# Patient Record
Sex: MALE | Race: WHITE
[De-identification: names, ages, dates, MRNs, and addresses within clinical notes are randomized per-mention and may not be internally consistent; named-entity substitution may affect disease eponyms.]

---

## 2018-10-13 ENCOUNTER — HOSPITAL ENCOUNTER (EMERGENCY)
Dept: HOSPITAL 47 - EC | Age: 33
Discharge: HOME | End: 2018-10-13
Payer: COMMERCIAL

## 2018-10-13 VITALS — TEMPERATURE: 98.5 F | HEART RATE: 100 BPM | DIASTOLIC BLOOD PRESSURE: 73 MMHG | SYSTOLIC BLOOD PRESSURE: 113 MMHG

## 2018-10-13 VITALS — RESPIRATION RATE: 18 BRPM

## 2018-10-13 DIAGNOSIS — J40: Primary | ICD-10-CM

## 2018-10-13 DIAGNOSIS — Z90.89: ICD-10-CM

## 2018-10-13 DIAGNOSIS — J98.01: ICD-10-CM

## 2018-10-13 DIAGNOSIS — Z77.22: ICD-10-CM

## 2018-10-13 PROCEDURE — 99285 EMERGENCY DEPT VISIT HI MDM: CPT

## 2018-10-13 PROCEDURE — 96372 THER/PROPH/DIAG INJ SC/IM: CPT

## 2018-10-13 PROCEDURE — 71046 X-RAY EXAM CHEST 2 VIEWS: CPT

## 2018-10-13 PROCEDURE — 94640 AIRWAY INHALATION TREATMENT: CPT

## 2018-10-13 NOTE — XR
EXAMINATION TYPE: XR chest 2V

 

DATE OF EXAM: 10/13/2018

 

HISTORY: Difficulty breathing .

 

REFERENCE: NONE.

 

FINDINGS: The lungs are clear. Pleural space are clear. The heart is not enlarged.

 

IMPRESSION: 

 

NO ACTIVE INTRATHORACIC DISEASE.

## 2018-10-13 NOTE — ED
General Adult HPI





- General


Chief complaint: Upper Respiratory Infection


Stated complaint: Cough


Time Seen by Provider: 10/13/18 07:25


Source: patient, RN notes reviewed


Mode of arrival: ambulatory


Limitations: no limitations





- History of Present Illness


Initial comments: 





This is a 32-year-old male who presents emergency Department because of 

difficulty breathing and a cough.  Patient states he lives with a smoker.  

Patient states he's been coughing for about 2 weeks and over the last week has 

gotten progressively worse.  Patient states over the last few days he started 

having quite a bit of sputum production.  Patient states he has not had a fever 

that he knows of but he has had the chills.  Patient denies any chest pain or 

palpitations.  Patient states he does hear himself wheezing on occasion as 

well.  Patient denies any abdominal pain patient denies nausea vomiting 

diarrhea.  Patient denies any headache patient denies any numbness weakness.  

Patient denies any lightheadedness or dizziness.





- Related Data


 Previous Rx's











 Medication  Instructions  Recorded


 


Amoxicillin 500 mg PO Q8H 10 Days  day 10/30/16


 


Ibuprofen [Motrin] 600 mg PO Q6HR PRN #20 day 10/30/16


 


Albuterol Inhaler [Ventolin Hfa 1 - 2 puff INHALATION Q6HR PRN #2 10/13/18





Inhaler] puff 


 


Azithromycin [Zithromax Tri-Jules] 500 mg PO DAILY #3 tab 10/13/18


 


predniSONE 40 mg PO DAILY #8 tab 10/13/18











 Allergies











Allergy/AdvReac Type Severity Reaction Status Date / Time


 


No Known Allergies Allergy   Verified 10/13/18 07:29














Review of Systems


ROS Statement: 


Those systems with pertinent positive or pertinent negative responses have been 

documented in the HPI.





ROS Other: All systems not noted in ROS Statement are negative.





Past Medical History


Past Medical History: Seizure Disorder


Additional Past Medical History / Comment(s): narcolepsy, eare infections


History of Any Multi-Drug Resistant Organisms: None Reported


Past Surgical History: Ear Surgery, Tonsillectomy


Past Psychological History: ADD/ADHD


Smoking Status: Never smoker


Past Alcohol Use History: Occasional


Past Drug Use History: None Reported





General Exam





- General Exam Comments


Initial Comments: 





GENERAL:


Patient is well-developed and well-nourished.  Patient is nontoxic and well-

hydrated and is in mild distress.





ENT:


Neck is soft and supple.  No significant lymphadenopathy is noted.  Oropharynx 

is clear.  Moist mucous membranes.  Neck has full range of motion without 

eliciting any pain.  





EYES:


The sclera were anicteric and conjunctiva were pink and moist.  Extraocular 

movements were intact and pupils were equal round and reactive to light.  

Eyelids were unremarkable.





PULMONARY:


Patient has occasional aspirin wheeze.





CARDIOVASCULAR:


There is a regular rate and rhythm without any murmurs gallops or rubs.  





ABDOMEN:


Soft and nontender with normal bowel sounds.  No palpable organomegaly was 

noted.  There is no palpable pulsatile mass.





SKIN:


Skin is clear with no lesions or rashes and otherwise unremarkable.





NEUROLOGIC:


Patient is alert and oriented x3.  Cranial nerves II through XII are grossly 

intact.  Motor and sensory are also intact.  Normal speech, volume and content.

  Symmetrical smile.  





MUSCULOSKELETAL:


Normal extremities with adequate strength and full range of motion.  





LYMPHATICS:


No significant lymphadenopathy is noted





PSYCHIATRIC:


Normal psychiatric evaluation.  


Limitations: no limitations





Course


 Vital Signs











  10/13/18 10/13/18 10/13/18





  07:27 08:06 08:15


 


Temperature 98.3 F  


 


Pulse Rate 106 H 100 108 H


 


Respiratory 18  





Rate   


 


Blood Pressure 127/70  


 


O2 Sat by Pulse 92 L  





Oximetry   














  10/13/18





  09:00


 


Temperature 98.5 F


 


Pulse Rate 100


 


Respiratory 18





Rate 


 


Blood Pressure 113/73


 


O2 Sat by Pulse 95





Oximetry 














Medical Decision Making





- Medical Decision Making





Chest x-ray shows no acute abnormality





Patient received Rocephin emergency department and breathing treatment.  After 

the breathing treatment patient was feeling much better and his wheezing had 

resolved.





Disposition


Clinical Impression: 


 Bronchitis with bronchospasm





Disposition: HOME SELF-CARE


Instructions:  Acute Bronchitis (ED), Bronchospasm (ED)


Prescriptions: 


Albuterol Inhaler [Ventolin Hfa Inhaler] 1 - 2 puff INHALATION Q6HR PRN #2 puff


 PRN Reason: Difficulty breathing  


Azithromycin [Zithromax Tri-Jules] 500 mg PO DAILY #3 tab


predniSONE 40 mg PO DAILY #8 tab


Is patient prescribed a controlled substance at d/c from ED?: No


Referrals: 


None,Stated [Primary Care Provider] - 1-2 days


Time of Disposition: 08:51

## 2019-08-23 ENCOUNTER — HOSPITAL ENCOUNTER (EMERGENCY)
Dept: HOSPITAL 47 - EC | Age: 34
Discharge: HOME | End: 2019-08-23
Payer: COMMERCIAL

## 2019-08-23 VITALS — TEMPERATURE: 98.3 F | SYSTOLIC BLOOD PRESSURE: 124 MMHG | HEART RATE: 76 BPM | DIASTOLIC BLOOD PRESSURE: 78 MMHG

## 2019-08-23 VITALS — RESPIRATION RATE: 18 BRPM

## 2019-08-23 DIAGNOSIS — R11.2: ICD-10-CM

## 2019-08-23 DIAGNOSIS — R10.9: Primary | ICD-10-CM

## 2019-08-23 LAB
ALBUMIN SERPL-MCNC: 4.8 G/DL (ref 3.5–5)
ALP SERPL-CCNC: 72 U/L (ref 38–126)
ALT SERPL-CCNC: 46 U/L (ref 21–72)
AMYLASE SERPL-CCNC: 61 U/L (ref 30–110)
ANION GAP SERPL CALC-SCNC: 12 MMOL/L
AST SERPL-CCNC: 27 U/L (ref 17–59)
BASOPHILS # BLD AUTO: 0.1 K/UL (ref 0–0.2)
BASOPHILS NFR BLD AUTO: 1 %
BUN SERPL-SCNC: 18 MG/DL (ref 9–20)
CALCIUM SPEC-MCNC: 9.7 MG/DL (ref 8.4–10.2)
CHLORIDE SERPL-SCNC: 105 MMOL/L (ref 98–107)
CO2 SERPL-SCNC: 24 MMOL/L (ref 22–30)
EOSINOPHIL # BLD AUTO: 0.4 K/UL (ref 0–0.7)
EOSINOPHIL NFR BLD AUTO: 5 %
ERYTHROCYTE [DISTWIDTH] IN BLOOD BY AUTOMATED COUNT: 5.09 M/UL (ref 4.3–5.9)
ERYTHROCYTE [DISTWIDTH] IN BLOOD: 13.9 % (ref 11.5–15.5)
GLUCOSE SERPL-MCNC: 115 MG/DL (ref 74–99)
HCT VFR BLD AUTO: 46.9 % (ref 39–53)
HGB BLD-MCNC: 16.3 GM/DL (ref 13–17.5)
LYMPHOCYTES # SPEC AUTO: 1.4 K/UL (ref 1–4.8)
LYMPHOCYTES NFR SPEC AUTO: 19 %
MCH RBC QN AUTO: 32 PG (ref 25–35)
MCHC RBC AUTO-ENTMCNC: 34.7 G/DL (ref 31–37)
MCV RBC AUTO: 92.3 FL (ref 80–100)
MONOCYTES # BLD AUTO: 0.4 K/UL (ref 0–1)
MONOCYTES NFR BLD AUTO: 5 %
NEUTROPHILS # BLD AUTO: 4.8 K/UL (ref 1.3–7.7)
NEUTROPHILS NFR BLD AUTO: 67 %
PH UR: 6 [PH] (ref 5–8)
PLATELET # BLD AUTO: 212 K/UL (ref 150–450)
POTASSIUM SERPL-SCNC: 3.7 MMOL/L (ref 3.5–5.1)
PROT SERPL-MCNC: 7.2 G/DL (ref 6.3–8.2)
SODIUM SERPL-SCNC: 141 MMOL/L (ref 137–145)
SP GR UR: 1.04 (ref 1–1.03)
UROBILINOGEN UR QL STRIP: <2 MG/DL (ref ?–2)
WBC # BLD AUTO: 7.1 K/UL (ref 3.8–10.6)

## 2019-08-23 PROCEDURE — 81003 URINALYSIS AUTO W/O SCOPE: CPT

## 2019-08-23 PROCEDURE — 96375 TX/PRO/DX INJ NEW DRUG ADDON: CPT

## 2019-08-23 PROCEDURE — 82150 ASSAY OF AMYLASE: CPT

## 2019-08-23 PROCEDURE — 99284 EMERGENCY DEPT VISIT MOD MDM: CPT

## 2019-08-23 PROCEDURE — 85025 COMPLETE CBC W/AUTO DIFF WBC: CPT

## 2019-08-23 PROCEDURE — 83690 ASSAY OF LIPASE: CPT

## 2019-08-23 PROCEDURE — 36415 COLL VENOUS BLD VENIPUNCTURE: CPT

## 2019-08-23 PROCEDURE — 96374 THER/PROPH/DIAG INJ IV PUSH: CPT

## 2019-08-23 PROCEDURE — 80053 COMPREHEN METABOLIC PANEL: CPT

## 2019-08-23 PROCEDURE — 74177 CT ABD & PELVIS W/CONTRAST: CPT

## 2019-08-23 PROCEDURE — 96361 HYDRATE IV INFUSION ADD-ON: CPT
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
## 2019-08-23 NOTE — CT
EXAMINATION TYPE: CT abdomen pelvis w con

 

DATE OF EXAM: 8/23/2019

 

COMPARISON: None

 

HISTORY: RLQ pain

 

CT DLP: 1718.1 mGycm

Automated exposure control for dose reduction was used.

 

TECHNIQUE:  Helical acquisition of images was performed from the lung bases through the pelvis.

 

CONTRAST: 

Performed without Oral Contrast and with IV Contrast, patient injected with 100 mL of Isovue 300.

 

FINDINGS: 

 

Lung bases are clear. There is no pleural effusion. Heart size is normal. There is no pericardial eff
usion.

 

Liver spleen pancreas gallbladder appear normal. Bile ducts are not dilated.

 

There is no adrenal mass. Stomach appears normal. Kidneys show satisfactory contrast opacification. T
here is no hydronephrosis. Ureters are not dilated. Bladder distends smoothly. There is no inguinal h
ernia. There is no free fluid in the pelvis. There is no evidence of pelvic mass. Appendix appears no
rmal.

 

There is no mesenteric edema. There is no ascites or free air. There is no evidence of bowel obstruct
ion.

 

Lumbar vertebra have normal spacing and alignment. Bony pelvis is intact. There is bone island in the
 anterior L4 vertebral body.

IMPRESSION: 

NEGATIVE CT SCAN ABDOMEN AND PELVIS. NORMAL APPENDIX. NO RENAL STONE OR OBSTRUCTION.

## 2019-09-27 ENCOUNTER — HOSPITAL ENCOUNTER (EMERGENCY)
Dept: HOSPITAL 47 - EC | Age: 34
LOS: 1 days | Discharge: HOME | End: 2019-09-28
Payer: COMMERCIAL

## 2019-09-27 VITALS — RESPIRATION RATE: 20 BRPM | TEMPERATURE: 98 F

## 2019-09-27 DIAGNOSIS — R10.84: Primary | ICD-10-CM

## 2019-09-27 DIAGNOSIS — R11.0: ICD-10-CM

## 2019-09-27 PROCEDURE — 81003 URINALYSIS AUTO W/O SCOPE: CPT

## 2019-09-27 PROCEDURE — 85025 COMPLETE CBC W/AUTO DIFF WBC: CPT

## 2019-09-27 PROCEDURE — 36415 COLL VENOUS BLD VENIPUNCTURE: CPT

## 2019-09-27 PROCEDURE — 86140 C-REACTIVE PROTEIN: CPT

## 2019-09-27 PROCEDURE — 80053 COMPREHEN METABOLIC PANEL: CPT

## 2019-09-27 PROCEDURE — 99284 EMERGENCY DEPT VISIT MOD MDM: CPT

## 2019-09-27 PROCEDURE — 83690 ASSAY OF LIPASE: CPT

## 2019-09-27 PROCEDURE — 82150 ASSAY OF AMYLASE: CPT

## 2019-09-27 PROCEDURE — 80329 ANALGESICS NON-OPIOID 1 OR 2: CPT

## 2019-09-28 VITALS — SYSTOLIC BLOOD PRESSURE: 137 MMHG | DIASTOLIC BLOOD PRESSURE: 98 MMHG | HEART RATE: 82 BPM

## 2019-09-28 LAB
ALBUMIN SERPL-MCNC: 4.7 G/DL (ref 3.5–5)
ALP SERPL-CCNC: 70 U/L (ref 38–126)
ALT SERPL-CCNC: 29 U/L (ref 21–72)
AMYLASE SERPL-CCNC: 50 U/L (ref 30–110)
ANION GAP SERPL CALC-SCNC: 11 MMOL/L
AST SERPL-CCNC: 22 U/L (ref 17–59)
BASOPHILS # BLD AUTO: 0.1 K/UL (ref 0–0.2)
BASOPHILS NFR BLD AUTO: 2 %
BUN SERPL-SCNC: 15 MG/DL (ref 9–20)
CALCIUM SPEC-MCNC: 9.9 MG/DL (ref 8.4–10.2)
CHLORIDE SERPL-SCNC: 102 MMOL/L (ref 98–107)
CO2 SERPL-SCNC: 28 MMOL/L (ref 22–30)
EOSINOPHIL # BLD AUTO: 0.3 K/UL (ref 0–0.7)
EOSINOPHIL NFR BLD AUTO: 5 %
ERYTHROCYTE [DISTWIDTH] IN BLOOD BY AUTOMATED COUNT: 4.97 M/UL (ref 4.3–5.9)
ERYTHROCYTE [DISTWIDTH] IN BLOOD: 12 % (ref 11.5–15.5)
GLUCOSE SERPL-MCNC: 133 MG/DL (ref 74–99)
HCT VFR BLD AUTO: 44.2 % (ref 39–53)
HGB BLD-MCNC: 16.1 GM/DL (ref 13–17.5)
LYMPHOCYTES # SPEC AUTO: 1.4 K/UL (ref 1–4.8)
LYMPHOCYTES NFR SPEC AUTO: 23 %
MCH RBC QN AUTO: 32.4 PG (ref 25–35)
MCHC RBC AUTO-ENTMCNC: 36.4 G/DL (ref 31–37)
MCV RBC AUTO: 89 FL (ref 80–100)
MONOCYTES # BLD AUTO: 0.3 K/UL (ref 0–1)
MONOCYTES NFR BLD AUTO: 6 %
NEUTROPHILS # BLD AUTO: 3.8 K/UL (ref 1.3–7.7)
NEUTROPHILS NFR BLD AUTO: 62 %
PH UR: 5 [PH] (ref 5–8)
PLATELET # BLD AUTO: 250 K/UL (ref 150–450)
POTASSIUM SERPL-SCNC: 3.8 MMOL/L (ref 3.5–5.1)
PROT SERPL-MCNC: 7.1 G/DL (ref 6.3–8.2)
SODIUM SERPL-SCNC: 141 MMOL/L (ref 137–145)
SP GR UR: 1.02 (ref 1–1.03)
UROBILINOGEN UR QL STRIP: <2 MG/DL (ref ?–2)
WBC # BLD AUTO: 6.1 K/UL (ref 3.8–10.6)

## 2019-09-28 NOTE — ED
Abdominal Pain HPI





- General


Chief Complaint: Abdominal Pain


Stated Complaint: Abd Pain


Time Seen by Provider: 09/27/19 23:51


Source: patient


Mode of arrival: ambulatory


Limitations: no limitations





- History of Present Illness


Initial Comments: 





This patient is a 33-year-old man who presents to be evaluated for abdominal 

pain.  He states that it has started after he began on antibiotic related to 

tooth pain.  He describes it as a bloating or cramping feeling.  He has not 

noted worsening or relieving factors.


MD Complaint: abdominal pain


-: hour(s)


Location: diffuse


Radiation: none


Migration to: no migration


Severity: mild


Quality: cramping


Consistency: intermittent


Improves With: nothing


Worsens With: nothing


Associated Symptoms: nausea





- Related Data


                                  Previous Rx's











 Medication  Instructions  Recorded


 


Amoxicillin 500 mg PO Q8H 10 Days  day 10/30/16


 


Ibuprofen [Motrin] 600 mg PO Q6HR PRN #20 day 10/30/16


 


Albuterol Inhaler [Ventolin Hfa 1 - 2 puff INHALATION Q6HR PRN #2 10/13/18





Inhaler] puff 


 


Azithromycin [Zithromax Tri-Jules] 500 mg PO DAILY #3 tab 10/13/18


 


predniSONE 40 mg PO DAILY #8 tab 10/13/18


 


Ondansetron Odt [Zofran Odt] 4 mg PO Q8HR PRN #10 tab 08/23/19











                                    Allergies











Allergy/AdvReac Type Severity Reaction Status Date / Time


 


No Known Allergies Allergy   Verified 09/27/19 23:51














Review of Systems


ROS Statement: 


Those systems with pertinent positive or pertinent negative responses have been 

documented in the HPI.





ROS Other: All systems not noted in ROS Statement are negative.


Constitutional: Denies: fever, chills


Respiratory: Denies: cough, dyspnea


Cardiovascular: Denies: chest pain, palpitations, edema


Gastrointestinal: Reports: abdominal pain, nausea.  Denies: vomiting


Genitourinary: Denies: dysuria, hematuria


Musculoskeletal: Denies: back pain


Skin: Denies: rash


Neurological: Denies: headache, weakness, numbness





Past Medical History


Past Medical History: Seizure Disorder


Additional Past Medical History / Comment(s): narcolepsy


History of Any Multi-Drug Resistant Organisms: None Reported


Past Surgical History: Ear Surgery, Tonsillectomy


Past Psychological History: ADD/ADHD, Depression


Smoking Status: Never smoker


Past Alcohol Use History: Occasional


Past Drug Use History: None Reported





General Exam


Limitations: no limitations


General appearance: alert, in no apparent distress


Head exam: Present: atraumatic, normocephalic


Eye exam: Present: normal appearance.  Absent: scleral icterus, conjunctival 

injection


ENT exam: Present: normal oropharynx


Neck exam: Present: normal inspection, full ROM


Respiratory exam: Present: normal lung sounds bilaterally.  Absent: respiratory 

distress, wheezes, rales, rhonchi, stridor


Cardiovascular Exam: Present: regular rate, normal rhythm, normal heart sounds. 

Absent: systolic murmur, diastolic murmur, rubs, gallop


GI/Abdominal exam: Present: soft.  Absent: distended, tenderness, guarding, 

rebound, rigid, mass


Extremities exam: Present: normal inspection, normal capillary refill.  Absent: 

pedal edema, calf tenderness


Back exam: Present: normal inspection.  Absent: CVA tenderness (R), CVA 

tenderness (L)


Neurological exam: Present: alert


Skin exam: Present: warm, dry, intact, normal color.  Absent: rash





Course


                                   Vital Signs











  09/27/19 09/28/19





  23:47 01:28


 


Temperature 98 F 


 


Pulse Rate 100 82


 


Respiratory 20 20





Rate  


 


Blood Pressure 137/97 137/98


 


O2 Sat by Pulse 99 98





Oximetry  














Medical Decision Making





- Lab Data


Result diagrams: 


                                 09/28/19 00:06





                                 09/28/19 00:06


                                   Lab Results











  09/28/19 09/28/19 09/28/19 Range/Units





  00:02 00:06 00:06 


 


WBC    6.1  (3.8-10.6)  k/uL


 


RBC    4.97  (4.30-5.90)  m/uL


 


Hgb    16.1  (13.0-17.5)  gm/dL


 


Hct    44.2  (39.0-53.0)  %


 


MCV    89.0  (80.0-100.0)  fL


 


MCH    32.4  (25.0-35.0)  pg


 


MCHC    36.4  (31.0-37.0)  g/dL


 


RDW    12.0  (11.5-15.5)  %


 


Plt Count    250  (150-450)  k/uL


 


Neutrophils %    62  %


 


Lymphocytes %    23  %


 


Monocytes %    6  %


 


Eosinophils %    5  %


 


Basophils %    2  %


 


Neutrophils #    3.8  (1.3-7.7)  k/uL


 


Lymphocytes #    1.4  (1.0-4.8)  k/uL


 


Monocytes #    0.3  (0-1.0)  k/uL


 


Eosinophils #    0.3  (0-0.7)  k/uL


 


Basophils #    0.1  (0-0.2)  k/uL


 


Sodium   141   (137-145)  mmol/L


 


Potassium   3.8   (3.5-5.1)  mmol/L


 


Chloride   102   ()  mmol/L


 


Carbon Dioxide   28   (22-30)  mmol/L


 


Anion Gap   11   mmol/L


 


BUN   15   (9-20)  mg/dL


 


Creatinine   1.03   (0.66-1.25)  mg/dL


 


Est GFR (CKD-EPI)AfAm   >90   (>60 ml/min/1.73 sqM)  


 


Est GFR (CKD-EPI)NonAf   >90   (>60 ml/min/1.73 sqM)  


 


Glucose   133 H   (74-99)  mg/dL


 


Calcium   9.9   (8.4-10.2)  mg/dL


 


Total Bilirubin   0.6   (0.2-1.3)  mg/dL


 


AST   22   (17-59)  U/L


 


ALT   29   (21-72)  U/L


 


Alkaline Phosphatase   70   ()  U/L


 


C-Reactive Protein   <5.0   (<10.0)  mg/L


 


Total Protein   7.1   (6.3-8.2)  g/dL


 


Albumin   4.7   (3.5-5.0)  g/dL


 


Amylase   50   ()  U/L


 


Lipase   99   ()  U/L


 


Urine Color     


 


Urine Appearance     (Clear)  


 


Urine pH     (5.0-8.0)  


 


Ur Specific Gravity     (1.001-1.035)  


 


Urine Protein     (Negative)  


 


Urine Glucose (UA)     (Negative)  


 


Urine Ketones     (Negative)  


 


Urine Blood     (Negative)  


 


Urine Nitrite     (Negative)  


 


Urine Bilirubin     (Negative)  


 


Urine Urobilinogen     (<2.0)  mg/dL


 


Ur Leukocyte Esterase     (Negative)  


 


Acetaminophen  <10.0    ug/mL














  09/28/19 Range/Units





  00:06 


 


WBC   (3.8-10.6)  k/uL


 


RBC   (4.30-5.90)  m/uL


 


Hgb   (13.0-17.5)  gm/dL


 


Hct   (39.0-53.0)  %


 


MCV   (80.0-100.0)  fL


 


MCH   (25.0-35.0)  pg


 


MCHC   (31.0-37.0)  g/dL


 


RDW   (11.5-15.5)  %


 


Plt Count   (150-450)  k/uL


 


Neutrophils %   %


 


Lymphocytes %   %


 


Monocytes %   %


 


Eosinophils %   %


 


Basophils %   %


 


Neutrophils #   (1.3-7.7)  k/uL


 


Lymphocytes #   (1.0-4.8)  k/uL


 


Monocytes #   (0-1.0)  k/uL


 


Eosinophils #   (0-0.7)  k/uL


 


Basophils #   (0-0.2)  k/uL


 


Sodium   (137-145)  mmol/L


 


Potassium   (3.5-5.1)  mmol/L


 


Chloride   ()  mmol/L


 


Carbon Dioxide   (22-30)  mmol/L


 


Anion Gap   mmol/L


 


BUN   (9-20)  mg/dL


 


Creatinine   (0.66-1.25)  mg/dL


 


Est GFR (CKD-EPI)AfAm   (>60 ml/min/1.73 sqM)  


 


Est GFR (CKD-EPI)NonAf   (>60 ml/min/1.73 sqM)  


 


Glucose   (74-99)  mg/dL


 


Calcium   (8.4-10.2)  mg/dL


 


Total Bilirubin   (0.2-1.3)  mg/dL


 


AST   (17-59)  U/L


 


ALT   (21-72)  U/L


 


Alkaline Phosphatase   ()  U/L


 


C-Reactive Protein   (<10.0)  mg/L


 


Total Protein   (6.3-8.2)  g/dL


 


Albumin   (3.5-5.0)  g/dL


 


Amylase   ()  U/L


 


Lipase   ()  U/L


 


Urine Color  Yellow  


 


Urine Appearance  Clear  (Clear)  


 


Urine pH  5.0  (5.0-8.0)  


 


Ur Specific Gravity  1.018  (1.001-1.035)  


 


Urine Protein  Negative  (Negative)  


 


Urine Glucose (UA)  Negative  (Negative)  


 


Urine Ketones  Negative  (Negative)  


 


Urine Blood  Negative  (Negative)  


 


Urine Nitrite  Negative  (Negative)  


 


Urine Bilirubin  Negative  (Negative)  


 


Urine Urobilinogen  <2.0  (<2.0)  mg/dL


 


Ur Leukocyte Esterase  Negative  (Negative)  


 


Acetaminophen   ug/mL














Disposition


Clinical Impression: 


 Abdominal pain





Disposition: HOME SELF-CARE


Condition: Good


Instructions (If sedation given, give patient instructions):  Abdominal Pain 

(ED)


Is patient prescribed a controlled substance at d/c from ED?: No


Referrals: 


None,Stated [Primary Care Provider] - 1-2 days

## 2019-11-10 ENCOUNTER — HOSPITAL ENCOUNTER (EMERGENCY)
Dept: HOSPITAL 47 - EC | Age: 34
Discharge: HOME | End: 2019-11-10
Payer: COMMERCIAL

## 2019-11-10 VITALS — RESPIRATION RATE: 18 BRPM | TEMPERATURE: 97.7 F

## 2019-11-10 VITALS — HEART RATE: 72 BPM | SYSTOLIC BLOOD PRESSURE: 124 MMHG | DIASTOLIC BLOOD PRESSURE: 77 MMHG

## 2019-11-10 DIAGNOSIS — R10.13: Primary | ICD-10-CM

## 2019-11-10 DIAGNOSIS — R00.0: ICD-10-CM

## 2019-11-10 DIAGNOSIS — M79.661: ICD-10-CM

## 2019-11-10 LAB
ALBUMIN SERPL-MCNC: 5.1 G/DL (ref 3.5–5)
ALP SERPL-CCNC: 62 U/L (ref 38–126)
ALT SERPL-CCNC: 28 U/L (ref 21–72)
ANION GAP SERPL CALC-SCNC: 12 MMOL/L
APTT BLD: 26.1 SEC (ref 22–30)
AST SERPL-CCNC: 31 U/L (ref 17–59)
BASOPHILS # BLD AUTO: 0.1 K/UL (ref 0–0.2)
BASOPHILS NFR BLD AUTO: 1 %
BUN SERPL-SCNC: 19 MG/DL (ref 9–20)
CALCIUM SPEC-MCNC: 10 MG/DL (ref 8.4–10.2)
CHLORIDE SERPL-SCNC: 104 MMOL/L (ref 98–107)
CO2 SERPL-SCNC: 23 MMOL/L (ref 22–30)
EOSINOPHIL # BLD AUTO: 0.2 K/UL (ref 0–0.7)
EOSINOPHIL NFR BLD AUTO: 3 %
ERYTHROCYTE [DISTWIDTH] IN BLOOD BY AUTOMATED COUNT: 5.42 M/UL (ref 4.3–5.9)
ERYTHROCYTE [DISTWIDTH] IN BLOOD: 11.9 % (ref 11.5–15.5)
GLUCOSE SERPL-MCNC: 97 MG/DL (ref 74–99)
HCT VFR BLD AUTO: 50.2 % (ref 39–53)
HGB BLD-MCNC: 17.4 GM/DL (ref 13–17.5)
INR PPP: 1.1 (ref ?–1.2)
LYMPHOCYTES # SPEC AUTO: 0.9 K/UL (ref 1–4.8)
LYMPHOCYTES NFR SPEC AUTO: 15 %
MAGNESIUM SPEC-SCNC: 2.2 MG/DL (ref 1.6–2.3)
MCH RBC QN AUTO: 32.1 PG (ref 25–35)
MCHC RBC AUTO-ENTMCNC: 34.6 G/DL (ref 31–37)
MCV RBC AUTO: 92.6 FL (ref 80–100)
MONOCYTES # BLD AUTO: 0.3 K/UL (ref 0–1)
MONOCYTES NFR BLD AUTO: 5 %
NEUTROPHILS # BLD AUTO: 4.8 K/UL (ref 1.3–7.7)
NEUTROPHILS NFR BLD AUTO: 74 %
PLATELET # BLD AUTO: 229 K/UL (ref 150–450)
POTASSIUM SERPL-SCNC: 4.3 MMOL/L (ref 3.5–5.1)
PROT SERPL-MCNC: 7.7 G/DL (ref 6.3–8.2)
PT BLD: 11.3 SEC (ref 9–12)
SODIUM SERPL-SCNC: 139 MMOL/L (ref 137–145)
WBC # BLD AUTO: 6.5 K/UL (ref 3.8–10.6)

## 2019-11-10 PROCEDURE — 93005 ELECTROCARDIOGRAM TRACING: CPT

## 2019-11-10 PROCEDURE — 85025 COMPLETE CBC W/AUTO DIFF WBC: CPT

## 2019-11-10 PROCEDURE — 71275 CT ANGIOGRAPHY CHEST: CPT

## 2019-11-10 PROCEDURE — 80053 COMPREHEN METABOLIC PANEL: CPT

## 2019-11-10 PROCEDURE — 85610 PROTHROMBIN TIME: CPT

## 2019-11-10 PROCEDURE — 83880 ASSAY OF NATRIURETIC PEPTIDE: CPT

## 2019-11-10 PROCEDURE — 85730 THROMBOPLASTIN TIME PARTIAL: CPT

## 2019-11-10 PROCEDURE — 84484 ASSAY OF TROPONIN QUANT: CPT

## 2019-11-10 PROCEDURE — 99285 EMERGENCY DEPT VISIT HI MDM: CPT

## 2019-11-10 PROCEDURE — 83735 ASSAY OF MAGNESIUM: CPT

## 2019-11-10 PROCEDURE — 36415 COLL VENOUS BLD VENIPUNCTURE: CPT

## 2019-11-10 PROCEDURE — 93970 EXTREMITY STUDY: CPT

## 2019-11-10 PROCEDURE — 83690 ASSAY OF LIPASE: CPT

## 2019-11-10 NOTE — ED
General Adult HPI





- General


Chief complaint: Chest Pain


Stated complaint: Chest pain


Source: patient


Mode of arrival: ambulatory


Limitations: no limitations





- History of Present Illness


Initial comments: 





Dictation was produced using dragon dictation software. please excuse any 

grammatical, word or spelling errors. 





Chief Complaint: 33-year-old male presents with epigastric abdominal pain, chest

pain and palpitations since the last 3 days.





History of Present Illness: He 3-year-old male presents with the affirmation 

symptoms for the last 3 days.  Patient states he also has concomitant right 

posterior calf pain.  Patient states he noticed that he's been having 

palpitations.  He feels as though his heart is beating really hard and fast.  

Patient states the pain is to his lower substernal chest and epigastric area.  

Denies any radiation to the extremities or the back.  He states his pain is 

mild.  Does report that it's exacerbated with palpitations.  Patient states 

she's been having right posterior calf pain it's worse with plantar flexion.  

Denies any history of blood clots.  Patient denies that his symptoms are worse 

with deep inspiration.  Patient does not endorse dyspnea at this time.








The ROS documented in this emergency department record has been reviewed and 

confirmed by me.  Those systems with pertinent positive or negative responses 

have been documented in the HPI.  All other systems are other negative and/or 

noncontributory.








PHYSICAL EXAM:


General Impression: Alert and oriented x3, not in acute distress


HEENT: Normocephalic atraumatic, extra-ocular movements intact, pupils equal and

reactive to light bilaterally, mucous membranes moist.


Cardiovascular: Heart regular rate and rhythm, S1&S2 audible, no murmurs, rubs 

or gallops


Chest: Lungs clear to auscultation bilaterally, no rhonchi, no wheeze, no rales


Abdomen: Bowel sounds present, abdomen soft, non-tender, non-distended, no 

organomegaly


Musculoskeletal: Pulses present and equal in all extremities, no peripheral 

edema, no asymmetric leg swelling


Motor:  no focal deficits noted


Neurological: CN II-XII grossly intact, no focal motor or sensory deficits noted


Skin: Intact with no visualized rashes


Psych: Normal affect and mood





ED course: 33-year-old male presents with epigastric pain, chest pain and right 

calf pain.  Vitals upon arrival shows heart rate of 101, worse vital signs 

within normal limits.  Patient is tachycardic with S1 q 3 T3 pattern on his EKG.

 Given that he is having lower shortly symptoms as well as concern for pulmonary

embolus.


CT angios the chest is unremarkable.  Venous duplex ultrasound of the right 

lower extremity was negative for DVT.  Laboratory evaluation was obtained 

showing no acute processes.  Patient observed in emergency department for 

several hours with no progression of his symptoms.  This concerned that maybe 

patient's symptoms are from gastritis.  Patient given a prescription for 

Prilosec.  Told to follow-up with his primary care physician.





EKG interpretation: Ventricular rate 94, normal sinus rhythm, S1 q 3 T3 AK 

interval 142, QS 90, . No AK prolongation, no QTC prolongation, no ST or 

T-wave changes noted.   








- Related Data


                                  Previous Rx's











 Medication  Instructions  Recorded


 


Omeprazole [PriLOSEC] 40 mg PO DAILY 30 Days #30 11/10/19





 capsule. 











                                    Allergies











Allergy/AdvReac Type Severity Reaction Status Date / Time


 


No Known Allergies Allergy   Verified 11/10/19 16:11














Review of Systems


ROS Statement: 


Those systems with pertinent positive or pertinent negative responses have been 

documented in the HPI.





ROS Other: All systems not noted in ROS Statement are negative.





Past Medical History


Past Medical History: Seizure Disorder


Additional Past Medical History / Comment(s): narcolepsy


History of Any Multi-Drug Resistant Organisms: None Reported


Past Surgical History: Ear Surgery, Tonsillectomy


Past Psychological History: ADD/ADHD, Depression


Smoking Status: Never smoker


Past Alcohol Use History: Occasional


Past Drug Use History: None Reported





General Exam


Limitations: no limitations





Course


                                   Vital Signs











  11/10/19





  15:30


 


Temperature 97.7 F


 


Pulse Rate 101 H


 


Respiratory 18





Rate 


 


Blood Pressure 127/83


 


O2 Sat by Pulse 97





Oximetry 














Medical Decision Making





- Lab Data


Result diagrams: 


                                 11/10/19 15:58





                                 11/10/19 15:58


                                   Lab Results











  11/10/19 11/10/19 11/10/19 Range/Units





  15:58 15:58 15:58 


 


WBC  6.5    (3.8-10.6)  k/uL


 


RBC  5.42    (4.30-5.90)  m/uL


 


Hgb  17.4    (13.0-17.5)  gm/dL


 


Hct  50.2    (39.0-53.0)  %


 


MCV  92.6    (80.0-100.0)  fL


 


MCH  32.1    (25.0-35.0)  pg


 


MCHC  34.6    (31.0-37.0)  g/dL


 


RDW  11.9    (11.5-15.5)  %


 


Plt Count  229    (150-450)  k/uL


 


Neutrophils %  74    %


 


Lymphocytes %  15    %


 


Monocytes %  5    %


 


Eosinophils %  3    %


 


Basophils %  1    %


 


Neutrophils #  4.8    (1.3-7.7)  k/uL


 


Lymphocytes #  0.9 L    (1.0-4.8)  k/uL


 


Monocytes #  0.3    (0-1.0)  k/uL


 


Eosinophils #  0.2    (0-0.7)  k/uL


 


Basophils #  0.1    (0-0.2)  k/uL


 


PT     (9.0-12.0)  sec


 


INR     (<1.2)  


 


APTT     (22.0-30.0)  sec


 


Sodium   139   (137-145)  mmol/L


 


Potassium   4.3   (3.5-5.1)  mmol/L


 


Chloride   104   ()  mmol/L


 


Carbon Dioxide   23   (22-30)  mmol/L


 


Anion Gap   12   mmol/L


 


BUN   19   (9-20)  mg/dL


 


Creatinine   0.88   (0.66-1.25)  mg/dL


 


Est GFR (CKD-EPI)AfAm   >90   (>60 ml/min/1.73 sqM)  


 


Est GFR (CKD-EPI)NonAf   >90   (>60 ml/min/1.73 sqM)  


 


Glucose   97   (74-99)  mg/dL


 


Calcium   10.0   (8.4-10.2)  mg/dL


 


Magnesium   2.2   (1.6-2.3)  mg/dL


 


Total Bilirubin   1.1   (0.2-1.3)  mg/dL


 


AST   31   (17-59)  U/L


 


ALT   28   (21-72)  U/L


 


Alkaline Phosphatase   62   ()  U/L


 


Troponin I     (0.000-0.034)  ng/mL


 


NT-Pro-B Natriuret Pep    25  pg/mL


 


Total Protein   7.7   (6.3-8.2)  g/dL


 


Albumin   5.1 H   (3.5-5.0)  g/dL


 


Lipase   69   ()  U/L














  11/10/19 11/10/19 Range/Units





  15:58 15:58 


 


WBC    (3.8-10.6)  k/uL


 


RBC    (4.30-5.90)  m/uL


 


Hgb    (13.0-17.5)  gm/dL


 


Hct    (39.0-53.0)  %


 


MCV    (80.0-100.0)  fL


 


MCH    (25.0-35.0)  pg


 


MCHC    (31.0-37.0)  g/dL


 


RDW    (11.5-15.5)  %


 


Plt Count    (150-450)  k/uL


 


Neutrophils %    %


 


Lymphocytes %    %


 


Monocytes %    %


 


Eosinophils %    %


 


Basophils %    %


 


Neutrophils #    (1.3-7.7)  k/uL


 


Lymphocytes #    (1.0-4.8)  k/uL


 


Monocytes #    (0-1.0)  k/uL


 


Eosinophils #    (0-0.7)  k/uL


 


Basophils #    (0-0.2)  k/uL


 


PT  11.3   (9.0-12.0)  sec


 


INR  1.1   (<1.2)  


 


APTT  26.1   (22.0-30.0)  sec


 


Sodium    (137-145)  mmol/L


 


Potassium    (3.5-5.1)  mmol/L


 


Chloride    ()  mmol/L


 


Carbon Dioxide    (22-30)  mmol/L


 


Anion Gap    mmol/L


 


BUN    (9-20)  mg/dL


 


Creatinine    (0.66-1.25)  mg/dL


 


Est GFR (CKD-EPI)AfAm    (>60 ml/min/1.73 sqM)  


 


Est GFR (CKD-EPI)NonAf    (>60 ml/min/1.73 sqM)  


 


Glucose    (74-99)  mg/dL


 


Calcium    (8.4-10.2)  mg/dL


 


Magnesium    (1.6-2.3)  mg/dL


 


Total Bilirubin    (0.2-1.3)  mg/dL


 


AST    (17-59)  U/L


 


ALT    (21-72)  U/L


 


Alkaline Phosphatase    ()  U/L


 


Troponin I   <0.012  (0.000-0.034)  ng/mL


 


NT-Pro-B Natriuret Pep    pg/mL


 


Total Protein    (6.3-8.2)  g/dL


 


Albumin    (3.5-5.0)  g/dL


 


Lipase    ()  U/L














Disposition


Clinical Impression: 


 Epigastric pain





Disposition: HOME SELF-CARE


Condition: Good


Instructions (If sedation given, give patient instructions):  Chest Pain (ED)


Prescriptions: 


Omeprazole [PriLOSEC] 40 mg PO DAILY 30 Days #30 capsule.dr


Is patient prescribed a controlled substance at d/c from ED?: No


Referrals: 


Lorna Luque MD [REFERRING] - 1-2 days


Time of Disposition: 19:07

## 2019-11-10 NOTE — US
EXAMINATION TYPE: US venous doppler duplex LE BI

 

DATE OF EXAM: 11/10/2019 4:18 PM

 

COMPARISON: NONE

 

CLINICAL HISTORY: DVT. Chest pain

 

SIDE PERFORMED: Bilateral  

 

TECHNIQUE:  The lower extremity deep venous system is examined utilizing real time linear array sonog
paul with graded compression, doppler sonography and color-flow sonography.

 

VESSELS IMAGED:

External Iliac Vein (EIV)

Common Femoral Vein

Deep Femoral Vein

Greater Saphenous Vein *

Femoral Vein

Popliteal Vein

Small Saphenous Vein *

Proximal Calf Veins

(* superficial vessels)

 

 

 

Right Leg:  Negative for DVT

 

Left Leg:  Negative for DVT

 

 

 

IMPRESSION:

1. Bilateral lower extremity ultrasound negative for deep venous thrombosis

## 2019-11-10 NOTE — CT
CT CHEST FOR PULMONARY EMBOLISM.

 

EXAMINATION TYPE: CT angio chest

 

DATE OF EXAM: 11/10/2019

 

INDICATION: Syncope w/fall, weakness, anemia.

 

CT DLP: 674.7 mGycm, Automated exposure control for dose reduction was used.

 

CONTRAST: Patient injected with 100 mL of Isovue 300.

 

COMPARISON: None

 

TECHNIQUE: CT of the chest is performed on a spiral scan at 2 mm thick sections.  Study is performed 
with intravenous contrast timed for evaluation for pulmonary embolism.  This will limit additional po
rtions of the evaluation.  3-D MIP images reconstructed by the technologist are reviewed on the compu
ter in the coronal and sagittal planes. 

 

FINDINGS:

No persistent filling defects are evident to suggest an acute pulmonary embolism.

 

No mediastinal or hilar adenopathy enlarged by CT criteria is evident.  The ascending aorta diameter 
at the level of the main pulmonary artery is 3.5 cm.  The main pulmonary artery diameter at the bifur
cation is 3.3 cm.

 

Lung windows are clear.

 

Limited CT section through the upper abdomen. There is mild to moderate fatty infiltration within the
 liver.

 

IMPRESSIONS:

1. No acute pulmonary embolism.

2. Mild fatty infiltration liver

## 2019-11-19 ENCOUNTER — HOSPITAL ENCOUNTER (EMERGENCY)
Dept: HOSPITAL 47 - EC | Age: 34
Discharge: HOME | End: 2019-11-19
Payer: COMMERCIAL

## 2019-11-19 VITALS — SYSTOLIC BLOOD PRESSURE: 122 MMHG | HEART RATE: 76 BPM | DIASTOLIC BLOOD PRESSURE: 71 MMHG | TEMPERATURE: 98.4 F

## 2019-11-19 VITALS — RESPIRATION RATE: 18 BRPM

## 2019-11-19 DIAGNOSIS — R10.9: ICD-10-CM

## 2019-11-19 DIAGNOSIS — R19.5: Primary | ICD-10-CM

## 2019-11-19 LAB
ALBUMIN SERPL-MCNC: 4.8 G/DL (ref 3.5–5)
ALP SERPL-CCNC: 73 U/L (ref 38–126)
ALT SERPL-CCNC: 30 U/L (ref 21–72)
AMYLASE SERPL-CCNC: 64 U/L (ref 30–110)
ANION GAP SERPL CALC-SCNC: 10 MMOL/L
AST SERPL-CCNC: 22 U/L (ref 17–59)
BASOPHILS # BLD AUTO: 0.1 K/UL (ref 0–0.2)
BASOPHILS NFR BLD AUTO: 1 %
BUN SERPL-SCNC: 16 MG/DL (ref 9–20)
CALCIUM SPEC-MCNC: 9.9 MG/DL (ref 8.4–10.2)
CHLORIDE SERPL-SCNC: 111 MMOL/L (ref 98–107)
CO2 SERPL-SCNC: 22 MMOL/L (ref 22–30)
EOSINOPHIL # BLD AUTO: 0.2 K/UL (ref 0–0.7)
EOSINOPHIL NFR BLD AUTO: 4 %
ERYTHROCYTE [DISTWIDTH] IN BLOOD BY AUTOMATED COUNT: 5.06 M/UL (ref 4.3–5.9)
ERYTHROCYTE [DISTWIDTH] IN BLOOD: 11.9 % (ref 11.5–15.5)
GLUCOSE SERPL-MCNC: 111 MG/DL (ref 74–99)
HCT VFR BLD AUTO: 47.2 % (ref 39–53)
HGB BLD-MCNC: 16.5 GM/DL (ref 13–17.5)
LYMPHOCYTES # SPEC AUTO: 1 K/UL (ref 1–4.8)
LYMPHOCYTES NFR SPEC AUTO: 20 %
MCH RBC QN AUTO: 32.6 PG (ref 25–35)
MCHC RBC AUTO-ENTMCNC: 35 G/DL (ref 31–37)
MCV RBC AUTO: 93.2 FL (ref 80–100)
MONOCYTES # BLD AUTO: 0.4 K/UL (ref 0–1)
MONOCYTES NFR BLD AUTO: 7 %
NEUTROPHILS # BLD AUTO: 3.3 K/UL (ref 1.3–7.7)
NEUTROPHILS NFR BLD AUTO: 63 %
PH UR: 7.5 [PH] (ref 5–8)
PLATELET # BLD AUTO: 199 K/UL (ref 150–450)
POTASSIUM SERPL-SCNC: 4.3 MMOL/L (ref 3.5–5.1)
PROT SERPL-MCNC: 7.2 G/DL (ref 6.3–8.2)
SODIUM SERPL-SCNC: 143 MMOL/L (ref 137–145)
SP GR UR: 1.02 (ref 1–1.03)
UROBILINOGEN UR QL STRIP: <2 MG/DL (ref ?–2)
WBC # BLD AUTO: 5.1 K/UL (ref 3.8–10.6)

## 2019-11-19 PROCEDURE — 81003 URINALYSIS AUTO W/O SCOPE: CPT

## 2019-11-19 PROCEDURE — 99283 EMERGENCY DEPT VISIT LOW MDM: CPT

## 2019-11-19 PROCEDURE — 36415 COLL VENOUS BLD VENIPUNCTURE: CPT

## 2019-11-19 PROCEDURE — 80053 COMPREHEN METABOLIC PANEL: CPT

## 2019-11-19 PROCEDURE — 83690 ASSAY OF LIPASE: CPT

## 2019-11-19 PROCEDURE — 82150 ASSAY OF AMYLASE: CPT

## 2019-11-19 PROCEDURE — 85025 COMPLETE CBC W/AUTO DIFF WBC: CPT

## 2019-11-19 NOTE — ED
General Adult HPI





- General


Chief complaint: Recheck/Abnormal Lab/Rx


Stated complaint: Having white stool


Time Seen by Provider: 11/19/19 11:55


Source: patient


Mode of arrival: ambulatory


Limitations: no limitations





- History of Present Illness


Initial comments: 





Patient is a 34-year-old male presenting to the emergency department complaining

that he had a bowel movement today and "some of his stools were white in color".

 Patient was concerned and wanted to be seen.  Patient did show me a picture of 

his stool and it appears to be pale in color.  Patient states he's been having 

ongoing intermittent abdominal pain for the past 2-3 weeks.  Patient states 

there is no pattern to his belly pain and it is not in one certain area, it 

tends to move around.  Patient denies fever, chills, nausea, vomiting, diarrhea,

chest pain, shortness of breath.  Patient has been eating and drinking as 

normal.  Patient denies history of abdominal surgeries.  Patient has no urinary 

complaints at this time.  Patient has no other pertinent past medical history 

and takes no medications.  Patient is no other complaints at this time.  Upon 

arrival to the ER, vital signs are stable.





- Related Data


                                Home Medications











 Medication  Instructions  Recorded  Confirmed


 


Ibuprofen [Motrin Ib] 400 mg PO Q6H PRN 11/19/19 11/19/19











                                    Allergies











Allergy/AdvReac Type Severity Reaction Status Date / Time


 


No Known Allergies Allergy   Verified 11/19/19 12:07














Review of Systems


ROS Statement: 


Those systems with pertinent positive or pertinent negative responses have been 

documented in the HPI.





ROS Other: All systems not noted in ROS Statement are negative.





Past Medical History


Past Medical History: Seizure Disorder


Additional Past Medical History / Comment(s): narcolepsy


History of Any Multi-Drug Resistant Organisms: None Reported


Past Surgical History: Ear Surgery, Tonsillectomy


Past Psychological History: ADD/ADHD, Depression


Smoking Status: Never smoker


Past Alcohol Use History: Occasional


Past Drug Use History: None Reported





General Exam





- General Exam Comments


Initial Comments: 





GENERAL: 


Well-appearing, well-nourished and in no acute distress.





HEAD: 


Atraumatic, normocephalic.





EYES:


Pupils equal round and reactive to light, extraocular movements intact, sclera 

anicteric, conjunctiva are normal.





ENT: 


TMs normal, nares patent, oropharynx clear without exudates.  Moist mucous 

membranes.





NECK: 


Normal range of motion, supple without lymphadenopathy or JVD.





LUNGS:


 Breath sounds clear to auscultation bilaterally and equal.  No wheezes rales or

rhonchi.





HEART:


Regular rate and rhythm without murmurs, rubs or gallops.





ABDOMEN: 


Soft, nontender, normoactive bowel sounds.  No guarding, no rebound.  No masses 

appreciated.





EXTREMITIES: 


Normal range of motion, no pitting or edema.  No clubbing or cyanosis.





NEUROLOGICAL: 


Normal speech, normal gait.





PSYCH:


Normal mood, normal affect.





SKIN:


 Warm, Dry, normal turgor, no rashes or lesions noted.


Limitations: no limitations





Course


                                   Vital Signs











  11/19/19





  11:49


 


Temperature 97.7 F


 


Pulse Rate 87


 


Respiratory 18





Rate 


 


Blood Pressure 141/88


 


O2 Sat by Pulse 97





Oximetry 














Medical Decision Making





- Medical Decision Making





Patient is a 34-year-old male presenting with discoloration of his stool 1 day.

 Patient vital signs are stable.  Patient's exam is unremarkable.  No belly 

pain.  Lab work is unremarkable and shows no acute processes.  UA shows no signs

of infection or dehydration.  I discussed with patient that his discoloration is

most likely secondary to his diet.  Patient is stable for discharge at this 

time.  Patient was given referral for a PCP since he does not have one.  Return 

parameters were discussed with the patient he verbalizes understanding.  Case 

discussed with Dr. Callahan.





- Lab Data


Result diagrams: 


                                 11/19/19 12:10





                                 11/19/19 12:10


                                   Lab Results











  11/19/19 11/19/19 11/19/19 Range/Units





  12:10 12:10 12:10 


 


WBC   5.1   (3.8-10.6)  k/uL


 


RBC   5.06   (4.30-5.90)  m/uL


 


Hgb   16.5   (13.0-17.5)  gm/dL


 


Hct   47.2   (39.0-53.0)  %


 


MCV   93.2   (80.0-100.0)  fL


 


MCH   32.6   (25.0-35.0)  pg


 


MCHC   35.0   (31.0-37.0)  g/dL


 


RDW   11.9   (11.5-15.5)  %


 


Plt Count   199   (150-450)  k/uL


 


Neutrophils %   63   %


 


Lymphocytes %   20   %


 


Monocytes %   7   %


 


Eosinophils %   4   %


 


Basophils %   1   %


 


Neutrophils #   3.3   (1.3-7.7)  k/uL


 


Lymphocytes #   1.0   (1.0-4.8)  k/uL


 


Monocytes #   0.4   (0-1.0)  k/uL


 


Eosinophils #   0.2   (0-0.7)  k/uL


 


Basophils #   0.1   (0-0.2)  k/uL


 


Sodium  143    (137-145)  mmol/L


 


Potassium  4.3    (3.5-5.1)  mmol/L


 


Chloride  111 H    ()  mmol/L


 


Carbon Dioxide  22    (22-30)  mmol/L


 


Anion Gap  10    mmol/L


 


BUN  16    (9-20)  mg/dL


 


Creatinine  0.81    (0.66-1.25)  mg/dL


 


Est GFR (CKD-EPI)AfAm  >90    (>60 ml/min/1.73 sqM)  


 


Est GFR (CKD-EPI)NonAf  >90    (>60 ml/min/1.73 sqM)  


 


Glucose  111 H    (74-99)  mg/dL


 


Calcium  9.9    (8.4-10.2)  mg/dL


 


Total Bilirubin  0.7    (0.2-1.3)  mg/dL


 


AST  22    (17-59)  U/L


 


ALT  30    (21-72)  U/L


 


Alkaline Phosphatase  73    ()  U/L


 


Total Protein  7.2    (6.3-8.2)  g/dL


 


Albumin  4.8    (3.5-5.0)  g/dL


 


Amylase  64    ()  U/L


 


Lipase  186    ()  U/L


 


Urine Color    Yellow  


 


Urine Appearance    Clear  (Clear)  


 


Urine pH    7.5  (5.0-8.0)  


 


Ur Specific Gravity    1.023  (1.001-1.035)  


 


Urine Protein    Negative  (Negative)  


 


Urine Glucose (UA)    Negative  (Negative)  


 


Urine Ketones    Negative  (Negative)  


 


Urine Blood    Negative  (Negative)  


 


Urine Nitrite    Negative  (Negative)  


 


Urine Bilirubin    Negative  (Negative)  


 


Urine Urobilinogen    <2.0  (<2.0)  mg/dL


 


Ur Leukocyte Esterase    Negative  (Negative)  














Disposition


Clinical Impression: 


 Discoloration of stool





Disposition: HOME SELF-CARE


Condition: Stable


Instructions (If sedation given, give patient instructions):  Normal Exam (ED)


Additional Instructions: 


Please return to the Emergency Department if symptoms worsen or any other 

concerns.


Patient given referral for PCP.


Is patient prescribed a controlled substance at d/c from ED?: No


Referrals: 


None,Stated [Primary Care Provider] - 1-2 days


Roxann Shannon MD [STAFF PHYSICIAN] - 1-2 days